# Patient Record
Sex: FEMALE | Race: WHITE | NOT HISPANIC OR LATINO | Employment: OTHER | URBAN - METROPOLITAN AREA
[De-identification: names, ages, dates, MRNs, and addresses within clinical notes are randomized per-mention and may not be internally consistent; named-entity substitution may affect disease eponyms.]

---

## 2017-04-07 ENCOUNTER — TELEPHONE (OUTPATIENT)
Dept: UROGYNECOLOGY | Facility: CLINIC | Age: 72
End: 2017-04-07

## 2017-04-07 NOTE — TELEPHONE ENCOUNTER
Pt states she has been on cipro for two week for ecoli uti pt states she did new test and still has uti. She will have records sent to office. appt given with NP Tuesday.

## 2017-04-07 NOTE — TELEPHONE ENCOUNTER
----- Message from Michelle Fields sent at 4/7/2017 11:07 AM CDT -----  Contact: Gely  Patient states had UTI, seen doctor and result was E-coli in urine; was given medication but will be out Rx Cipro on Saturday. Asking to be seen Monday, 4/10/17. Please call [phone]. Thanks!

## 2017-04-11 ENCOUNTER — OFFICE VISIT (OUTPATIENT)
Dept: UROGYNECOLOGY | Facility: CLINIC | Age: 72
End: 2017-04-11
Payer: MEDICARE

## 2017-04-11 VITALS
SYSTOLIC BLOOD PRESSURE: 172 MMHG | DIASTOLIC BLOOD PRESSURE: 98 MMHG | TEMPERATURE: 98 F | BODY MASS INDEX: 35.29 KG/M2 | HEIGHT: 67 IN | HEART RATE: 82 BPM | WEIGHT: 224.88 LBS

## 2017-04-11 DIAGNOSIS — N39.46 MIXED INCONTINENCE URGE AND STRESS: ICD-10-CM

## 2017-04-11 DIAGNOSIS — N81.89 PELVIC RELAXATION: ICD-10-CM

## 2017-04-11 DIAGNOSIS — R33.9 URINARY RETENTION: ICD-10-CM

## 2017-04-11 DIAGNOSIS — N39.8 VOIDING DYSFUNCTION: ICD-10-CM

## 2017-04-11 DIAGNOSIS — R30.0 DYSURIA: Primary | ICD-10-CM

## 2017-04-11 LAB
BILIRUB SERPL-MCNC: ABNORMAL MG/DL
BLOOD URINE, POC: ABNORMAL
COLOR, POC UA: YELLOW
GLUCOSE UR QL STRIP: ABNORMAL
KETONES UR QL STRIP: ABNORMAL
LEUKOCYTE ESTERASE URINE, POC: ABNORMAL
NITRITE, POC UA: ABNORMAL
PH, POC UA: 6
PROTEIN, POC: ABNORMAL
SPECIFIC GRAVITY, POC UA: 1
UROBILINOGEN, POC UA: ABNORMAL

## 2017-04-11 PROCEDURE — 99999 PR PBB SHADOW E&M-EST. PATIENT-LVL V: CPT | Mod: PBBFAC,,, | Performed by: NURSE PRACTITIONER

## 2017-04-11 PROCEDURE — 81002 URINALYSIS NONAUTO W/O SCOPE: CPT | Mod: PBBFAC,PO | Performed by: NURSE PRACTITIONER

## 2017-04-11 PROCEDURE — 99215 OFFICE O/P EST HI 40 MIN: CPT | Mod: PBBFAC,PO | Performed by: NURSE PRACTITIONER

## 2017-04-11 PROCEDURE — 99213 OFFICE O/P EST LOW 20 MIN: CPT | Mod: S$PBB,,, | Performed by: NURSE PRACTITIONER

## 2017-04-11 PROCEDURE — 87086 URINE CULTURE/COLONY COUNT: CPT

## 2017-04-11 RX ORDER — NITROFURANTOIN 25; 75 MG/1; MG/1
100 CAPSULE ORAL 2 TIMES DAILY
Qty: 10 CAPSULE | Refills: 0 | Status: SHIPPED | OUTPATIENT
Start: 2017-04-11 | End: 2017-04-16

## 2017-04-11 RX ORDER — NITROFURANTOIN 25; 75 MG/1; MG/1
CAPSULE ORAL 2 TIMES DAILY
COMMUNITY
Start: 2017-04-07

## 2017-04-11 RX ORDER — METOPROLOL SUCCINATE 25 MG/1
25 TABLET, EXTENDED RELEASE ORAL DAILY
COMMUNITY
Start: 2016-12-09 | End: 2017-12-09

## 2017-04-11 NOTE — PROGRESS NOTES
Subjective:       Patient ID: Gely Cruz is a 71 y.o. female.    Chief Complaint: Urinary Tract Infection    HPI  Gely Cruz is a 71 y.o. female who is new to me, presents today for follow up on UTI.  She was seen by her PCP who sent her urine for a culture and determined that E. Coli was the causative organism.  She was placed on   Cipro for 2 weeks and did not improve. She went back to the office and a UA on 04/06/17 was positive for nitrites and leukocytes.  She was switched to macrobid and encouraged to come to our office.  As the pt sits her today she is feeling slightly better after taking the macrobid.  She feels that her urine is not as cloudy as it was.  She continues to have tenderness at the end of cathing.  She has been self-cathing for several years and typically does not have a problem.  She has not seen any gross hematuria.  She has had an increase in her urinary frequency and urgency since the infection began.  She goes about every 2-3 hours instead of 4-5.  She does occasionally have some UUI.  She denies any real SAMANTHA at this time.  She denies any real feeling of PVF but caths because she has urinary retention.  She denies any vaginal discharge, bleeding or bulging sensation.  She denies any real vaginal complaints/concerns.  She has had breast cancer and is getting ready to do reconstructive surgery.  She is up to date on her WW.    Review of Systems   Constitutional: Negative for activity change, fever and unexpected weight change.   HENT: Negative for hearing loss.    Eyes: Negative for visual disturbance.   Respiratory: Negative for shortness of breath and wheezing.    Cardiovascular: Negative for chest pain, palpitations and leg swelling.   Gastrointestinal: Negative for abdominal pain, constipation and diarrhea.   Genitourinary: Positive for dysuria, frequency and urgency. Negative for dyspareunia, vaginal bleeding and vaginal discharge.   Musculoskeletal: Negative for gait problem and neck  pain.   Skin: Negative for rash and wound.   Allergic/Immunologic: Negative for immunocompromised state.   Neurological: Negative for tremors, speech difficulty and weakness.   Hematological: Does not bruise/bleed easily.   Psychiatric/Behavioral: Negative for agitation and confusion.       Objective:      Physical Exam   Constitutional: She is oriented to person, place, and time. She appears well-developed and well-nourished. No distress.   HENT:   Head: Normocephalic and atraumatic.   Neck: Neck supple. No thyromegaly present.   Pulmonary/Chest: Effort normal. No respiratory distress.   Abdominal: Soft. There is no tenderness. There is no CVA tenderness. No hernia.   Musculoskeletal: Normal range of motion.   Neurological: She is alert and oriented to person, place, and time.   Skin: Skin is warm and dry. No rash noted.   Psychiatric: She has a normal mood and affect. Her behavior is normal.     Pelvic Exam:  V: No lesions. No palpable nodes.   Va: no discharge or bleeding.  Good length.  Mild anterior and posterior relaxation  Meatus:No caruncle or stenosis, positive SAMANTHA while in the supine position.  Urethra: Non tender. No suburethral masses.  Cx/Cuff: Normal   Uterus: surgically absent  Ad: No mass or tenderness.  Levators :Symmetrical. Normal tone. Non tender.  BL: Non tender  RV: No hemorrhoids.      Assessment:       1. Dysuria    2. Voiding dysfunction    3. Urinary retention    4. Mixed incontinence urge and stress    5. Pelvic relaxation        Plan:       Dysuria-urine culture as noted below.  We will continue with the macrobid bid until the urine culture comes back.  -     POCT URINE DIPSTICK WITHOUT MICROSCOPE  -     Urine culture  -     nitrofurantoin, macrocrystal-monohydrate, (MACROBID) 100 MG capsule; Take 1 capsule (100 mg total) by mouth 2 (two) times daily.  Dispense: 10 capsule; Refill: 0    Voiding dysfunction- monitor at this time    Urinary retention- continue with self caths    Mixed  incontinence urge and stress- pt will monitor and if it increases or becomes bothersome for her, she will follow up.    Pelvic relaxation- monitor at this time    RTC 1 year PRN

## 2017-04-12 LAB — BACTERIA UR CULT: NO GROWTH

## 2020-03-03 ENCOUNTER — TELEPHONE (OUTPATIENT)
Dept: UROGYNECOLOGY | Facility: CLINIC | Age: 75
End: 2020-03-03

## 2020-03-03 NOTE — TELEPHONE ENCOUNTER
----- Message from Nahomi Renner sent at 3/3/2020  8:58 AM CST -----  Contact: Svetlana with [facility] phone [phone]    Svetlana with [facility] phone [phone]  Called she is calling to get a renewal script signed for her catheters.  Fax is [phone]

## 2020-12-29 ENCOUNTER — OFFICE VISIT (OUTPATIENT)
Dept: UROGYNECOLOGY | Facility: CLINIC | Age: 75
End: 2020-12-29
Payer: MEDICARE

## 2020-12-29 VITALS
SYSTOLIC BLOOD PRESSURE: 124 MMHG | WEIGHT: 224.88 LBS | DIASTOLIC BLOOD PRESSURE: 65 MMHG | BODY MASS INDEX: 35.29 KG/M2 | HEART RATE: 81 BPM | HEIGHT: 67 IN

## 2020-12-29 DIAGNOSIS — R35.0 URINARY FREQUENCY: Primary | ICD-10-CM

## 2020-12-29 DIAGNOSIS — N31.9 HYPOTONIC NEUROGENIC BLADDER: ICD-10-CM

## 2020-12-29 LAB
BILIRUB SERPL-MCNC: ABNORMAL MG/DL
BLOOD URINE, POC: ABNORMAL
CLARITY, POC UA: CLEAR
COLOR, POC UA: YELLOW
GLUCOSE UR QL STRIP: ABNORMAL
KETONES UR QL STRIP: ABNORMAL
LEUKOCYTE ESTERASE URINE, POC: ABNORMAL
NITRITE, POC UA: ABNORMAL
PH, POC UA: 5
PROTEIN, POC: ABNORMAL
SPECIFIC GRAVITY, POC UA: 1.01
UROBILINOGEN, POC UA: ABNORMAL

## 2020-12-29 PROCEDURE — 99202 PR OFFICE/OUTPT VISIT, NEW, LEVL II, 15-29 MIN: ICD-10-PCS | Mod: S$PBB,,, | Performed by: OBSTETRICS & GYNECOLOGY

## 2020-12-29 PROCEDURE — 99202 OFFICE O/P NEW SF 15 MIN: CPT | Mod: S$PBB,,, | Performed by: OBSTETRICS & GYNECOLOGY

## 2020-12-29 PROCEDURE — 87086 URINE CULTURE/COLONY COUNT: CPT

## 2020-12-29 PROCEDURE — 99999 PR PBB SHADOW E&M-EST. PATIENT-LVL IV: ICD-10-PCS | Mod: PBBFAC,,, | Performed by: OBSTETRICS & GYNECOLOGY

## 2020-12-29 PROCEDURE — 81002 URINALYSIS NONAUTO W/O SCOPE: CPT | Mod: PBBFAC,PO | Performed by: OBSTETRICS & GYNECOLOGY

## 2020-12-29 PROCEDURE — 99999 PR PBB SHADOW E&M-EST. PATIENT-LVL IV: CPT | Mod: PBBFAC,,, | Performed by: OBSTETRICS & GYNECOLOGY

## 2020-12-29 PROCEDURE — 99214 OFFICE O/P EST MOD 30 MIN: CPT | Mod: PBBFAC,PO | Performed by: OBSTETRICS & GYNECOLOGY

## 2020-12-29 RX ORDER — FLECAINIDE ACETATE 50 MG/1
TABLET ORAL
COMMUNITY
Start: 2020-12-09

## 2020-12-29 RX ORDER — FLECAINIDE ACETATE 50 MG/1
50 TABLET ORAL
COMMUNITY
Start: 2020-12-09

## 2020-12-29 RX ORDER — METOPROLOL SUCCINATE 25 MG/1
TABLET, EXTENDED RELEASE ORAL
COMMUNITY
Start: 2020-10-22

## 2020-12-29 RX ORDER — TRIAMCINOLONE ACETONIDE 1 MG/G
CREAM TOPICAL
COMMUNITY
Start: 2020-04-30

## 2020-12-29 RX ORDER — DIPHENHYDRAMINE HCL 25 MG
50 CAPSULE ORAL
COMMUNITY

## 2020-12-29 RX ORDER — TRAMADOL HYDROCHLORIDE 50 MG/1
TABLET ORAL
COMMUNITY
Start: 2020-12-10

## 2020-12-29 NOTE — PROGRESS NOTES
Subjective:     Chief Complaint:  Urinary retention and UTI  History of Present Illness: Gely Cruz is a 75 y.o. female who presents for follow-up.  she was previously seen in 2016 for UTI and urinary retention and has been intermittent catheterization since that.  She had planned to have InterStim done but that was side tracted because of her breast carcinoma and continued to do intermittent self catheterization.  She does it 4-5 times per day and gets large volumes each time.  She recently had UTI. the symptoms resolved with  Cipro.  She does not know the results of a culture.  This is 1st UTIs she has had in several years.  Her urinalysis today is negative.  She has survived breast cancer now for 7 years and is doing very well.  Overall she says her health is good.  Once the COVID pandemic has subsided she would like to have her InterStim but she is probably going to do it in [facility] as it is closer to home.    Review of Systems    Constitutional:  Lupus  Eyes: No vision changes.  ENT: No headaches.   Respiratory:  Nonsmoker  Cardiovascular: Afib. Hypertension  Gastrointestinal: No constipation. No diarrhea. No fecal incontinence.   Genitourinary: No vaginal bleeding or discharge.  Integument/Breast:  Breast carcinoma  Hematologic/Lymphatic:  history of anemia.  Musculoskeletal: OA  Neurological:  Weakness-uses walker  Behavioral/Psych: No history of depression.   Endocrine:  Thyroid disease  Allergy/Immune: no recent reactions     Objective:   General Exam:  General appearance:  Mask because of intermittent  HEENT: [place]. EOM's intact.  Neck: Normal thyroid.   Back: No CVA tenderness.  RESP: No SOB.  Breasts: deferred  Abdomen: Benign without localizing signs.  Extremities: No edema. No varices.  Lymphatic: noncontributory  Skin: No rashes. No lesions.  Neurologic:  Walker.   Psych: Oriented.       Assessment:   Chronic urinary retention with daily self catheterization   Recently resolved  UTI-history of recurrent UTIs with this is the only 1 in the last several years       Plan:    Urine culture  Follow-up for InterStim post pandemic  Refill catheters for self catheterization

## 2020-12-30 LAB — BACTERIA UR CULT: NO GROWTH

## 2020-12-31 ENCOUNTER — TELEPHONE (OUTPATIENT)
Dept: UROGYNECOLOGY | Facility: CLINIC | Age: 75
End: 2020-12-31

## 2020-12-31 NOTE — TELEPHONE ENCOUNTER
----- Message from Stephanie Hubbard sent at 12/31/2020 10:38 AM CST -----  Regarding: return call  Contact: self  Type:  Patient Returning Call    Who Called:  self  Who Left Message for Patient:    Does the patient know what this is regarding?:   Best Call Back Number:  [phone]  Additional Information:

## 2021-04-21 ENCOUNTER — TELEPHONE (OUTPATIENT)
Dept: UROGYNECOLOGY | Facility: CLINIC | Age: 76
End: 2021-04-21